# Patient Record
Sex: MALE | Race: WHITE | NOT HISPANIC OR LATINO | URBAN - METROPOLITAN AREA
[De-identification: names, ages, dates, MRNs, and addresses within clinical notes are randomized per-mention and may not be internally consistent; named-entity substitution may affect disease eponyms.]

---

## 2018-01-25 ENCOUNTER — IMPORTED ENCOUNTER (OUTPATIENT)
Dept: URBAN - METROPOLITAN AREA CLINIC 31 | Facility: CLINIC | Age: 68
End: 2018-01-25

## 2018-01-25 PROBLEM — H25.813: Noted: 2018-01-25

## 2018-01-25 PROBLEM — E11.9: Noted: 2018-01-25

## 2018-01-25 PROCEDURE — 92015 DETERMINE REFRACTIVE STATE: CPT

## 2018-01-25 PROCEDURE — 92004 COMPRE OPH EXAM NEW PT 1/>: CPT

## 2018-01-25 NOTE — PATIENT DISCUSSION
1. Combined Types of Cataract OU: Explained how cataracts can effect vision. Recommend clinical observation. The patient was advised to contact us if any change or worsening of vision. 2.  DM II without sign of Diabetic Retinopathy OU:  Discussed the pathophysiology of diabetes and its effect on the eye. Stressed the importance of regular followup and good control of BS BP and Lipids to avoid future complications. 3. Refractive error Change glasses.

## 2022-04-02 ASSESSMENT — VISUAL ACUITY
OD_SC: 20/30
OD_CC: 20/200
OS_CC: J114''
OS_SC: 20/30
OD_CC: J214''
OS_CC: 20/400

## 2022-04-02 ASSESSMENT — TONOMETRY
OS_IOP_MMHG: 12
OD_IOP_MMHG: 13